# Patient Record
Sex: FEMALE | Race: WHITE
[De-identification: names, ages, dates, MRNs, and addresses within clinical notes are randomized per-mention and may not be internally consistent; named-entity substitution may affect disease eponyms.]

---

## 2020-03-05 ENCOUNTER — HOSPITAL ENCOUNTER (EMERGENCY)
Dept: HOSPITAL 75 - ER | Age: 45
Discharge: HOME | End: 2020-03-05
Payer: COMMERCIAL

## 2020-03-05 VITALS — DIASTOLIC BLOOD PRESSURE: 95 MMHG | SYSTOLIC BLOOD PRESSURE: 144 MMHG

## 2020-03-05 VITALS — HEIGHT: 67.01 IN | WEIGHT: 165.35 LBS | BODY MASS INDEX: 25.95 KG/M2

## 2020-03-05 DIAGNOSIS — S63.502A: Primary | ICD-10-CM
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
## 2020-03-05 PROCEDURE — 73090 X-RAY EXAM OF FOREARM: CPT

## 2020-03-05 PROCEDURE — 73110 X-RAY EXAM OF WRIST: CPT

## 2020-03-05 NOTE — ED HIP PAIN/INJURY
General


Chief Complaint:  Upper Extremity


Stated Complaint:  LEFT ARM INJ


Nursing Triage Note:  


Pt amb to triage with c/o L wrist discomfort. Pt reports approx 30 minutes pta, 


she "slipped" on wet grass, landing on her L side. Pt reports intermittent 


numbness/tingling followed by sharp pain to L wrist. Pt displays ability to move




L wrist and wiggle distal fingers. Cap refill <3 seconds with present radial 


pulse. Denies further injury. A&OX4.


Source:  patient


Exam Limitations:  no limitations





History of Present Illness


Date Seen by Provider:  Mar 5, 2020


Time Seen by Provider:  12:25


Initial Comments


To ER with left wrist pain after a fall when she slipped on wet grass 30 minutes

prior to arrival.


Timing/Duration:  just prior to arrival


Severity:  moderate


Location:  other (left wrist)


Method of Injury:  fell


Modifying Factors:  Worse With Movement





Allergies and Home Medications


Allergies


Coded Allergies:  


     No Known Drug Allergies (Unverified , 3/5/20)





Patient Home Medication List


Home Medication List Reviewed:  Yes





Review of Systems


Constitutional:  see HPI


EENTM:  see HPI


Respiratory:  no symptoms reported


Cardiovascular:  no symptoms reported


Genitourinary:  no symptoms reported


Musculoskeletal:  see HPI


Skin:  no symptoms reported


Psychiatric/Neurological:  No Symptoms Reported





Past Medical-Social-Family Hx


Patient Social History


Recent Foreign Travel:  No


Contact w/Someone Who Travel:  No


Recent Infectious Disease Expo:  No





Physical Exam


Vital Signs





Vital Signs - First Documented








 3/5/20





 12:15


 


Temp 36.9


 


Pulse 105


 


Resp 17


 


B/P (MAP) 144/95 (111)


 


Pulse Ox 100


 


O2 Delivery Room Air





Capillary Refill : Less Than 3 Seconds


Height, Weight, BMI


Height: '"


Weight: lbs. oz. kg; 25.00 BMI


Method:


General Appearance:  No Apparent Distress, WD/WN


Neck:  Full Range of Motion, Normal Inspection


Respiratory:  Normal Breath Sounds, No Accessory Muscle Use, No Respiratory 

Distress


Gastrointestinal:  Non Tender, Soft


Extremity:  Other (brisk capillary refill of the fingertips, there is pain 

without obvious deformity to the left wrist.)


Neurologic/Psychiatric:  Alert, Oriented x3


Skin:  Normal Color, Warm/Dry





Progress/Results/Core Measures


Results/Orders


My Orders





Orders - ALVARO ELIZABETH


Hydrocodone/Apap 5/325 Tablet (Lortab 5 (3/5/20 12:30)


Wrist, Left, 3 Views Or More (3/5/20 12:23)


Forearm, Left, 2 Views (3/5/20 13:00)





Medications Given in ED





Current Medications








 Medications  Dose


 Ordered  Sig/Cameron


 Route  Start Time


 Stop Time Status Last Admin


Dose Admin


 


 Acetaminophen/


 Hydrocodone Bitart  1 tab  ONCE  ONCE


 PO  3/5/20 12:30


 3/5/20 12:31 DC 3/5/20 12:28


1 TAB








Vital Signs/I&O











 3/5/20





 12:15


 


Temp 36.9


 


Pulse 105


 


Resp 17


 


B/P (MAP) 144/95 (111)


 


Pulse Ox 100


 


O2 Delivery Room Air














Blood Pressure Mean:                    111











Departure


Communication (Admissions)


She is able to supinate and pronate which would not support a diagnosis of 

radial ulnar dislocation





Impression





   Primary Impression:  


   Wrist sprain


   Qualified Codes:  S63.502A - Unspecified sprain of left wrist, initial 

   encounter


Disposition:  01 HOME, SELF-CARE


Condition:  Stable





Departure-Patient Inst.


Decision time for Depature:  12:45


Referrals:  


PAOLO POTTS DO (PCP)


Primary Care Physician


Patient Instructions:  Wrist Sprain (DC)





Add. Discharge Instructions:  


1. Will splint as needed for comfort


2. Follow-up with your doctor this week as he may wish to refer you to 

orthopedics


3. Tylenol and pain control. If you have persistent pain into next week and MRI 

may be helpful to evaluate for a small fracture not seen on x-ray





All discharge instructions reviewed with patient and/or family. Voiced 

understanding.











ALVARO ELIZABETH              Mar 5, 2020 12:26

## 2020-03-05 NOTE — DIAGNOSTIC IMAGING REPORT
INDICATION: Pain, injury.



COMPARISON: None available.



TECHNIQUE: Three radiographs of the left wrist dated March 5, 2020.



FINDINGS: No acute fracture. The distal ulna is posteriorly

positioned in relationship to the distal radius on the lateral

radiograph. Carpal alignment is well maintained. Scapholunate

distance is within normal limits. No suspicious radiopaque

foreign body.



IMPRESSION: Distal ulna is slightly posteriorly positioned in

relationship to the distal radius. This may relate to subluxation

of the distal radioulnar joint, though this may simply be

positional in nature. Recommend correlation with physical

examination and pain.



Otherwise, unremarkable examination.



Dictated by: 



  Dictated on workstation # [id number]

## 2020-03-05 NOTE — DIAGNOSTIC IMAGING REPORT
INDICATION: Fall.



TIME OF EXAM: 1:10 p.m.



FINDINGS: Two views of the left forearm were obtained. Alignment

at the elbow and wrist appears normal. The radius and ulna appear

to be intact. No fractures are seen. There is no dislocation.



IMPRESSION: No acute bony abnormality is detected.



Dictated by: 



  Dictated on workstation # [id number]

## 2020-03-09 NOTE — XMS REPORT
Continuity of Care Document

                             Created on: 2020



ARBEN ACOSTA

External Reference #: [id number]

: 1975

Sex: Female



Demographics





                          Address                   [address] [place]  [address]

 

                          Home Phone                [phone] x

 

                          Preferred Language        Unknown

 

                          Marital Status            Unknown

 

                          Moravian Affiliation     Unknown

 

                          Race                      Unknown

 

                          Ethnic Group              Unknown





Author





                          Organization              Unknown

 

                          Address                   Unknown

 

                          Phone                     Unavailable



              



Allergies

      



             Active           Description           Code           Type         

  Severity   

                Reaction           Onset           Reported/Identified          

 

Relationship to Patient                 Clinical Status        

 

                Yes             No Known Drug Allergies           [id number]    

       Drug 

Allergy           Unknown           N/A                             2020  

      

                                                             



                  



Medications

      



There is no data.                  



Problems

      



             Date Dx Coded           Attending           Type           Code    

       

Diagnosis                               Diagnosed By        

 

                2015           PAOLO POTTS DO           Ot       

       729.81      

                                                             

 

                01/10/2016           PAOLO POTTS DO           Ot       

       729.81      

                                                             

 

                2019           PAOLO POTTS DO           Ot       

       729.81      

                          SWELLING OF LIMB                    

 

                2020           PAOLO POTTS DO           Ot       

       729.81      

                          SWELLING OF LIMB                    



                        



Procedures

      



There is no data.                  



Results

      



There is no data.              



Encounters

      



                ACCT No.           Visit Date/Time           Discharge          

 Status         

             Pt. Type           Provider           Facility           Loc./Unit 

          

Complaint        

 

                    [id number]           2020 12:08:00           

020 13:30:00        

                DIS             Emergency           ALVARO ELIZABETH         

  [facility]           ER                        LEFT ARM INJ        

 

                    [id number]           2015 14:23:00           

015 23:59:59        

                CLS             Outpatient           PAOLO POTTS DO    

       [facility] 

[facility]           RAD                       LT CLAVICLE SWO

LLEN AND 

SOFT AND MUSY        

 

             [id number]           2020 08:45:00                        P

EN           

Preadmit                  PAOLO POTTS DO           [facility]                 RAD                       1ST MAMMO, BASELINE

## 2020-03-09 NOTE — XMS REPORT
Patient Summarization Differential

                             Created on: 2020



ARBEN ACOSTA

External Reference #: [id number]

: 1975

Sex: Female



Demographics





                          Address                   [address]



 

                          Home Phone                [phone]

 

                          Preferred Language        Unknown

 

                          Marital Status            Unknown

 

                          [employer]eh[employer] Affiliation     Unknown

 

                          Race                      White

 

                          Ethnic Group              Unknown





Author





                          Author                    [facility]

 

                          Organization              [facility]

 

                          Address                   [address] [place] [address]



 

                          Phone                     [phone]







Care Team Providers





                    Care Team Member Name Role                Phone

 

                          Unavailable               Unavailable



                                            



Allergies

          No Information                                                        
 



Medications

          No Information                                                        
 



Problems

                      



      



           Problem   Normalized  Date of   Normalized  Normalized  Provider  Fac

ility



              Classification  Problem(s)   Problem      Problem      Problem Sta

tus  



                           Onset/Resoluti            Duration   



                                         on    

 

      



            Other      Swelling of   Episodic   Active     PAOLO    Not Availa

ble



                 connective      limb            GELLENDER , DO  ([id number])



                                         tissue disease      



                                         (1 source.)      



                                                                              



Procedures

          No Information                                                        
 



Immunizations

          No Information                                                        
 



Results

          No Information                                                        
 



Vital Signs

          No Information                                                        
 



Interventions

          No Information                                                        
 



Plan of Treatment

          No Information                                                        
 



Goals

          No Information                                                        
 



Social History

          No Information                                                        
 



Functional Status

          No Information                                                        
 



Mental Status

          No Information                                                        
 



Encounters

                      



    



              Encounter    Normalized Encounter  Encounter Diagnosis  Care Provi

dylan  

Organization



                           Date                      Type   

 

    



              2015   Patient encounter  no information  no name (no phone)

  no 

organization name



                           procedure                 (no phone)



                                                                              



Medical Equipment

          No Information                                                        
 



Payers

          No Information                                                



Additional Source Comments

          This clinical document has been generated using [employer] 
software that has been certified by the Office of the National Coordinator for 
Health Information Technology (ONC 15.99.04.3023.Diam.31.00.0.837769) and the 
National Committee for  (NCQA, as an eMeasure certified 
technology).            

            

FOR RECORDS PERTAINING TO PATIENTS WHO ARE OR HAVE BEEN ENROLLED IN A CHEMICAL D
EPENDENCY/SUBSTANCE ABUSE PROGRAM, SOME INFORMATION MAY BE OMITTED. This clinica
l summary was aggregated from multiple sources.  Caution should be exercised in 
using it in the provision of clinical care. This summary normalizes information 
from multiple sources, and as a consequence, information in this document may ma
terially change the coding, format and clinical context of patient data. In ishmael
tion, data may be omitted in some cases. CLINICAL DECISIONS SHOULD BE BASED ON T
HE PRIMARY CLINICAL RECORDS. [facility] provides no warranty or guara
ntee of the accuracy or completeness of information in this document.The followi
ng information is based on time limited clinical information

## 2020-03-13 ENCOUNTER — HOSPITAL ENCOUNTER (OUTPATIENT)
Dept: HOSPITAL 75 - RAD | Age: 45
End: 2020-03-13
Attending: FAMILY MEDICINE
Payer: COMMERCIAL

## 2020-03-13 DIAGNOSIS — Z12.31: Primary | ICD-10-CM

## 2020-03-13 DIAGNOSIS — R92.8: ICD-10-CM

## 2020-03-13 PROCEDURE — 77067 SCR MAMMO BI INCL CAD: CPT

## 2020-03-13 NOTE — DIAGNOSTIC IMAGING REPORT
INDICATION: Routine screening.



No prior mammograms are available for comparison. This is a

baseline study.



2-D and 3-D bilateral screening mammography was performed with a

Computer Aided Detection (CAD) system. 3-D tomosynthesis was also

performed and reviewed.



FINDINGS: Both breasts are heterogeneously dense, limiting the

sensitivity of mammography. No spiculated mass or malignant

appearing microcalcifications are seen. There is a prominent

density in the superior and outer right breast. This could

represent prominent fibroglandular tissue versus enlarged cyst.

Additional views are recommended.



IMPRESSION: Right breast density. Additional views recommended

for further evaluation.



ACR BI-RADS Category 0: Incomplete. (Needs additional imaging

evaluation).

Result letter will be mailed to the patient.

Note: At least 10% of breast cancer is not imaged by mammography.



Dictated by: 



  Dictated on workstation # [id number]

## 2020-03-25 ENCOUNTER — HOSPITAL ENCOUNTER (OUTPATIENT)
Dept: HOSPITAL 75 - RAD | Age: 45
End: 2020-03-25
Attending: FAMILY MEDICINE
Payer: COMMERCIAL

## 2020-03-25 DIAGNOSIS — N63.11: Primary | ICD-10-CM

## 2020-03-25 NOTE — DIAGNOSTIC IMAGING REPORT
INDICATION: 

Right breast density. Patient presents for additional views.



COMPARISON: 

Correlation is made with the recent screening study from

03/13/2020.



TECHNIQUE: 

Unilateral right 2D and 3D diagnostic mammography was performed

including spot compression CC and ML views as well as a 90 degree

lateral view.



FINDINGS:

There is some persistent density in the upper-outer right breast

at posterior depth. This is approximately 8 to 10 cm from the

nipple. No other abnormalities are seen.



IMPRESSION: 

Persistent density in the upper outer right breast. While this

could represent superimposed fibroglandular tissue, other

etiologies cannot be excluded. Further evaluation with ultrasound

is recommended and will be performed today.



ACR BI-RADS Category 0: Incomplete. (Needs additional imaging

evaluation).

Result letter will be mailed to the patient.

Note: At least 10% of breast cancer is not imaged by mammography.



Dictated by: 



  Dictated on workstation # [id number]

## 2020-03-25 NOTE — DIAGNOSTIC IMAGING REPORT
INDICATION: 

Incomplete resolution of density with spot compression on earlier

diagnostic views. Ultrasound was performed to exclude the

possibility of an underlying mass. 



FINDINGS: 

Sonographic surveillance throughout the upper outer quadrant of

the right breast in the region of questionable mammographic

density reveals no solid or cystic mass. No ductal ectasia. No

architectural distortion. 



IMPRESSION: 

Normal targeted right breast ultrasound. In light of the absence

of true mass mammographically as well as a normal targeted

ultrasound and assuming the absence of adverse clinical change,

the patient could be returned to routine bilateral screening next

due in 1 year.



ACR BI-RADS Category 2: Benign findings.



Dictated by: 



  Dictated on workstation # [id number]

## 2021-03-18 ENCOUNTER — HOSPITAL ENCOUNTER (OUTPATIENT)
Dept: HOSPITAL 75 - RAD | Age: 46
End: 2021-03-18
Attending: FAMILY MEDICINE
Payer: COMMERCIAL

## 2021-03-18 DIAGNOSIS — M41.86: ICD-10-CM

## 2021-03-18 DIAGNOSIS — M51.36: Primary | ICD-10-CM

## 2021-03-18 DIAGNOSIS — M25.78: ICD-10-CM

## 2021-03-18 DIAGNOSIS — M48.061: ICD-10-CM

## 2021-03-18 PROCEDURE — 72100 X-RAY EXAM L-S SPINE 2/3 VWS: CPT

## 2021-03-18 NOTE — DIAGNOSTIC IMAGING REPORT
INDICATION:  PAIN IN LOWER BACK GETTING WORSE ONSET 1 YEAR



TECHNIQUE: 

 AP, Lateral and Spot imaging of the lumbar spine



CORRELATION STUDY: 

None



FINDINGS:

There is some straightening of normal lumbar lordosis along with

a very mild leftward curvature. Asymmetric disc space narrowing

along the right aspect L2-L3 levels with asymmetric sclerosis and

osteophytes measured on the right. This is at the concavity of

the curvature. The lumbar vertebral body heights overall are

maintained. No acute appearing compression deformity. The

remaining disc spaces overall appear to be fairly well maintained

as well. Sacroiliac joints are maintained and unremarkable.



IMPRESSION: 

No radiographic evidence for acute bony abnormality of the lumbar

spine.  Mild leftward curvature lumbar spine with asymmetric disc

space narrowing or reactive endplate sclerosis and osteophyte

formation L2-L3 level on the right.



Dictated by: 



  Dictated on workstation # [id number]

## 2021-10-22 ENCOUNTER — HOSPITAL ENCOUNTER (OUTPATIENT)
Dept: HOSPITAL 75 - RAD | Age: 46
End: 2021-10-22
Attending: FAMILY MEDICINE
Payer: COMMERCIAL

## 2021-10-22 DIAGNOSIS — Z12.31: Primary | ICD-10-CM

## 2021-10-22 PROCEDURE — 77067 SCR MAMMO BI INCL CAD: CPT

## 2021-10-22 PROCEDURE — 77063 BREAST TOMOSYNTHESIS BI: CPT

## 2021-10-22 NOTE — DIAGNOSTIC IMAGING REPORT
Indication: Routine screening.



Comparison is made with prior mammogram 03/13/2020.



2-D and 3-D bilateral screening mammography was performed with

CAD.



Both breasts are heterogeneously dense, limiting the sensitivity

of mammography. There are benign calcifications in both breasts.

The parenchymal pattern is stable. No mass or malignant-appearing

microcalcifications are seen. Axillae are unremarkable.



IMPRESSION: 



BI-RADS category 2.



No mammographic features suspicious for malignancy are

identified.



ACR BI-RADS Category 2: Benign findings.

Result letter will be mailed to the patient.

Note: At least 10% of breast cancer is not imaged by mammography.



Dictated by: 



  Dictated on workstation # [id number]

## 2022-02-13 ENCOUNTER — HOSPITAL ENCOUNTER (EMERGENCY)
Dept: HOSPITAL 75 - ER | Age: 47
Discharge: HOME | End: 2022-02-13
Payer: COMMERCIAL

## 2022-02-13 VITALS — SYSTOLIC BLOOD PRESSURE: 133 MMHG | DIASTOLIC BLOOD PRESSURE: 68 MMHG

## 2022-02-13 VITALS — BODY MASS INDEX: 29.86 KG/M2 | HEIGHT: 66.93 IN | WEIGHT: 190.26 LBS

## 2022-02-13 DIAGNOSIS — Z20.822: ICD-10-CM

## 2022-02-13 DIAGNOSIS — R07.9: Primary | ICD-10-CM

## 2022-02-13 DIAGNOSIS — R55: ICD-10-CM

## 2022-02-13 DIAGNOSIS — Z72.0: ICD-10-CM

## 2022-02-13 LAB
ALBUMIN SERPL-MCNC: 4.3 GM/DL (ref 3.2–4.5)
ALP SERPL-CCNC: 77 U/L (ref 40–136)
ALT SERPL-CCNC: 17 U/L (ref 0–55)
APTT BLD: 28 SEC (ref 24–35)
BASOPHILS # BLD AUTO: 0 10^3/UL (ref 0–0.1)
BASOPHILS NFR BLD AUTO: 1 % (ref 0–10)
BILIRUB SERPL-MCNC: 0.6 MG/DL (ref 0.1–1)
BUN/CREAT SERPL: 24
CALCIUM SERPL-MCNC: 9.1 MG/DL (ref 8.5–10.1)
CHLORIDE SERPL-SCNC: 107 MMOL/L (ref 98–107)
CO2 SERPL-SCNC: 19 MMOL/L (ref 21–32)
CREAT SERPL-MCNC: 0.71 MG/DL (ref 0.6–1.3)
EOSINOPHIL # BLD AUTO: 0.2 10^3/UL (ref 0–0.3)
EOSINOPHIL NFR BLD AUTO: 2 % (ref 0–10)
GFR SERPLBLD BASED ON 1.73 SQ M-ARVRAT: 106 ML/MIN
GLUCOSE SERPL-MCNC: 110 MG/DL (ref 70–105)
HCT VFR BLD CALC: 44 % (ref 35–52)
HGB BLD-MCNC: 14.7 G/DL (ref 11.5–16)
INR PPP: 1 (ref 0.8–1.4)
LYMPHOCYTES # BLD AUTO: 2.3 10^3/UL (ref 1–4)
LYMPHOCYTES NFR BLD AUTO: 29 % (ref 12–44)
MAGNESIUM SERPL-MCNC: 2 MG/DL (ref 1.6–2.4)
MANUAL DIFFERENTIAL PERFORMED BLD QL: NO
MCH RBC QN AUTO: 30 PG (ref 25–34)
MCHC RBC AUTO-ENTMCNC: 33 G/DL (ref 32–36)
MCV RBC AUTO: 91 FL (ref 80–99)
MONOCYTES # BLD AUTO: 0.9 10^3/UL (ref 0–1)
MONOCYTES NFR BLD AUTO: 11 % (ref 0–12)
NEUTROPHILS # BLD AUTO: 4.5 10^3/UL (ref 1.8–7.8)
NEUTROPHILS NFR BLD AUTO: 57 % (ref 42–75)
PLATELET # BLD: 262 10^3/UL (ref 130–400)
PMV BLD AUTO: 8.9 FL (ref 9–12.2)
POTASSIUM SERPL-SCNC: 4.3 MMOL/L (ref 3.6–5)
PROT SERPL-MCNC: 7.3 GM/DL (ref 6.4–8.2)
PROTHROMBIN TIME: 13.1 SEC (ref 12.2–14.7)
SODIUM SERPL-SCNC: 137 MMOL/L (ref 135–145)
T4 FREE SERPL-MCNC: 1.21 NG/DL (ref 0.7–1.48)
WBC # BLD AUTO: 7.9 10^3/UL (ref 4.3–11)

## 2022-02-13 PROCEDURE — 84703 CHORIONIC GONADOTROPIN ASSAY: CPT

## 2022-02-13 PROCEDURE — 84439 ASSAY OF FREE THYROXINE: CPT

## 2022-02-13 PROCEDURE — 87636 SARSCOV2 & INF A&B AMP PRB: CPT

## 2022-02-13 PROCEDURE — 80053 COMPREHEN METABOLIC PANEL: CPT

## 2022-02-13 PROCEDURE — 36415 COLL VENOUS BLD VENIPUNCTURE: CPT

## 2022-02-13 PROCEDURE — 84484 ASSAY OF TROPONIN QUANT: CPT

## 2022-02-13 PROCEDURE — 83874 ASSAY OF MYOGLOBIN: CPT

## 2022-02-13 PROCEDURE — 86141 C-REACTIVE PROTEIN HS: CPT

## 2022-02-13 PROCEDURE — 71045 X-RAY EXAM CHEST 1 VIEW: CPT

## 2022-02-13 PROCEDURE — 83735 ASSAY OF MAGNESIUM: CPT

## 2022-02-13 PROCEDURE — 85730 THROMBOPLASTIN TIME PARTIAL: CPT

## 2022-02-13 PROCEDURE — 93041 RHYTHM ECG TRACING: CPT

## 2022-02-13 PROCEDURE — 85610 PROTHROMBIN TIME: CPT

## 2022-02-13 PROCEDURE — 84443 ASSAY THYROID STIM HORMONE: CPT

## 2022-02-13 PROCEDURE — 85025 COMPLETE CBC W/AUTO DIFF WBC: CPT

## 2022-02-13 PROCEDURE — 93005 ELECTROCARDIOGRAM TRACING: CPT

## 2022-02-13 RX ADMIN — NITROGLYCERIN PRN MG: 0.4 TABLET SUBLINGUAL at 11:20

## 2022-02-13 RX ADMIN — NITROGLYCERIN PRN MG: 0.4 TABLET SUBLINGUAL at 10:39

## 2022-02-13 NOTE — ED CHEST PAIN
General


Chief Complaint:  Chest Pain


Stated Complaint:  CHEST PAIN,SOB


Nursing Triage Note:  


AMB TO ED   FROM [facility] WITH C/O CHEST PAIN  ONSET  7AM TODAY. FELT LIKE AT TIMES 


HEART RACING. HAS HAD 3 NEG COVID TEST. IN  PAST WEEK.


Source:  patient


Exam Limitations:  no limitations





Allergies and Home Medications


Allergies


Coded Allergies:  


     No Known Drug Allergies (Unverified , 3/5/20)





Past Medical-Social-Family Hx


Patient Social History


Tobacco Use?:  Yes


Substance use?:  No


Alcohol Use?:  Yes


Alcohol Frequency:  Rarely





Immunizations Up To Date


First/Initial COVID19 Vaccinat:  MARCH


Second COVID19 Vaccination Wil:  MARCH


COVID19 Vaccine :  DEC





Past Medical History


Surgeries:  Yes


Orthopedic, Tubal Ligation


Respiratory:  No


Cardiac:  No


Neurological:  No


Genitourinary:  No


Gastrointestinal:  No


Musculoskeletal:  No


Endocrine:  Yes


Hypothyroidsim


HEENT:  No


Cancer:  No


Psychosocial:  Yes


Depression


Integumentary:  No





Physical Exam


Vital Signs





Vital Signs - First Documented








 2/13/22





 10:10


 


Temp 36.0


 


Pulse 92


 


Resp 18


 


B/P (MAP) 142/70 (94)


 


Pulse Ox 99


 


O2 Delivery Room Air





Capillary Refill : Less Than 3 Seconds


Height, Weight, BMI


Height: '"


Weight: lbs. oz. kg; 29.00 BMI


Method:





Progress/Results/Core Measures


Results/Orders


Lab Results





Laboratory Tests








Test


 2/13/22


10:10 2/13/22


11:20 2/13/22


12:45 Range/Units


 


 


White Blood Count


 7.9 


 


 


 4.3-11.0


10^3/uL


 


Red Blood Count


 4.88 


 


 


 3.80-5.11


10^6/uL


 


Hemoglobin 14.7    11.5-16.0  g/dL


 


Hematocrit 44    35-52  %


 


Mean Corpuscular Volume 91    80-99  fL


 


Mean Corpuscular Hemoglobin 30    25-34  pg


 


Mean Corpuscular Hemoglobin


Concent 33 


 


 


 32-36  g/dL





 


Red Cell Distribution Width 13.2    10.0-14.5  %


 


Platelet Count


 262 


 


 


 130-400


10^3/uL


 


Mean Platelet Volume 8.9 L   9.0-12.2  fL


 


Immature Granulocyte % (Auto) 0     %


 


Neutrophils (%) (Auto) 57    42-75  %


 


Lymphocytes (%) (Auto) 29    12-44  %


 


Monocytes (%) (Auto) 11    0-12  %


 


Eosinophils (%) (Auto) 2    0-10  %


 


Basophils (%) (Auto) 1    0-10  %


 


Neutrophils # (Auto)


 4.5 


 


 


 1.8-7.8


10^3/uL


 


Lymphocytes # (Auto)


 2.3 


 


 


 1.0-4.0


10^3/uL


 


Monocytes # (Auto)


 0.9 


 


 


 0.0-1.0


10^3/uL


 


Eosinophils # (Auto)


 0.2 


 


 


 0.0-0.3


10^3/uL


 


Basophils # (Auto)


 0.0 


 


 


 0.0-0.1


10^3/uL


 


Immature Granulocyte # (Auto)


 0.0 


 


 


 0.0-0.1


10^3/uL


 


Prothrombin Time 13.1    12.2-14.7  SEC


 


INR Comment 1.0    0.8-1.4  


 


Activated Partial


Thromboplast Time 28 


 


 


 24-35  SEC





 


Sodium Level 137    135-145  MMOL/L


 


Potassium Level 4.3    3.6-5.0  MMOL/L


 


Chloride Level 107      MMOL/L


 


Carbon Dioxide Level 19 L   21-32  MMOL/L


 


Anion Gap 11    5-14  MMOL/L


 


Blood Urea Nitrogen 17    7-18  MG/DL


 


Creatinine


 0.71 


 


 


 0.60-1.30


MG/DL


 


Estimat Glomerular Filtration


Rate 106 


 


 


  





 


BUN/Creatinine Ratio 24     


 


Glucose Level 110 H     MG/DL


 


Calcium Level 9.1    8.5-10.1  MG/DL


 


Corrected Calcium 8.9    8.5-10.1  MG/DL


 


Magnesium Level 2.0    1.6-2.4  MG/DL


 


Total Bilirubin 0.6    0.1-1.0  MG/DL


 


Aspartate Amino Transf


(AST/SGOT) 13 


 


 


 5-34  U/L





 


Alanine Aminotransferase


(ALT/SGPT) 17 


 


 


 0-55  U/L





 


Alkaline Phosphatase 77      U/L


 


Myoglobin


 29.1 


 


 


 10.0-92.0


NG/ML


 


Troponin I < 0.028   < 0.028  <0.028  NG/ML


 


C-Reactive Protein High


Sensitivity 0.29 


 


 


 0.00-0.50


MG/DL


 


Total Protein 7.3    6.4-8.2  GM/DL


 


Albumin 4.3    3.2-4.5  GM/DL


 


Thyroid Stimulating Hormone


(TSH) 0.40 


 


 


 0.35-4.94


UIU/ML


 


Free Thyroxine


 1.21 


 


 


 0.70-1.48


NG/DL


 


Serum Pregnancy Test,


Qualitative NEGATIVE 


 


 


 NEGATIVE  





 


Influenza Type A (RT-PCR)  Not Detected   Not Detecte  


 


Influenza Type B (RT-PCR)  Not Detected   Not Detecte  


 


SARS-CoV-2 RNA (RT-PCR)  Not Detected   Not Detecte  








My Orders





Orders - MISAEL ARMIJO MD


Cbc With Automated Diff (2/13/22 10:14)


Magnesium (2/13/22 10:14)


Chest 1 View, Ap/Pa Only (2/13/22 10:14)


Ekg Tracing (2/13/22 10:14)


Comprehensive Metabolic Panel (2/13/22 10:14)


Myoglobin Serum (2/13/22 10:14)


Protime With Inr (2/13/22 10:14)


Partial Thromboplastin Time (2/13/22 10:14)


O2 (2/13/22 10:14)


Monitor-Rhythm Ecg Trace Only (2/13/22 10:14)


Lipid Panel (2/14/22 06:00)


Ed Iv/Invasive Line Start (2/13/22 10:14)


Troponin I [place] (2/13/22 10:14)


Hs C Reactive Protein (2/13/22 10:14)


Covid 19 Inhouse Test (2/13/22 10:14)


Influenza A And B By Pcr (2/13/22 10:14)


Hcg,Qualitative Serum (2/13/22 10:14)


Nitroglycerin 0.4 Mg Btl 25's (Nitrostat (2/13/22 10:30)


Aspirin Chewable Tablet (Baby Aspirin Ch (2/13/22 10:30)


Thyroid Stimulating Hormone (2/13/22 10:33)


Free T4 (Free Thyroxine) (2/13/22 10:33)


Troponin I [place] (2/13/22 12:45)





Medications Given in ED





Current Medications








 Medications  Dose


 Ordered  Sig/Cameron


 Route  Start Time


 Stop Time Status Last Admin


Dose Admin


 


 Aspirin  324 mg  ONCE  ONCE


 PO  2/13/22 10:30


 2/13/22 10:31 DC 2/13/22 10:38


324 MG


 


 Nitroglycerin  0.4 mg  UD  PRN


 SL  2/13/22 10:30


    2/13/22 11:20


0.4 MG








Vital Signs/I&O











 2/13/22 2/13/22





 10:10 11:39


 


Temp 36.0 


 


Pulse 92 82


 


Resp 18 18


 


B/P (MAP) 142/70 (94) 119/67


 


Pulse Ox 99 96


 


O2 Delivery Room Air Room Air














Blood Pressure Mean:                    84











Departure


Impression





   Primary Impression:  


   Chest pain


   Qualified Codes:  R07.9 - Chest pain, unspecified


   Additional Impression:  


   Near syncope


Disposition:  01 HOME, SELF-CARE


Condition:  Improved





Departure-Patient Inst.


Decision time for Depature:  13:39


Referrals:  


PAOLO POTTS DO (PCP/Family)


Primary Care Physician








LAWANDA QUISPE MD [facility]





BALAJI DUTTON MD, DAVID L JR, MD


Patient Instructions:  Chest Pain, Near Fainting (DC)





Add. Discharge Instructions:  


Drink plenty of clear liquids to stay well-hydrated.


Take aspirin 81 mg daily until instructed otherwise.


Although no serious medical problems were identified in your work-up in the 

emergency room, your work-up is not complete.  You need to discuss your 

lightheadedness and chest pain with your primary care provider and seek referral

to a cardiologist for further evaluation.  You may get a referral through Dr. Potts's office or contact one of the cardiologists below.


Work toward stopping smoking.


Return to the ER if you have recurrent episodes of chest pain or other worsening

symptoms.





All discharge instructions reviewed with patient and/or family. Voiced 

understanding.





Copy


Copies To 1:   PAOLO POTTS JOSHUA T MD        Feb 13, 2022 13:41

## 2022-02-21 ENCOUNTER — HOSPITAL ENCOUNTER (OUTPATIENT)
Dept: HOSPITAL 75 - CARD | Age: 47
End: 2022-02-21
Attending: INTERNAL MEDICINE
Payer: COMMERCIAL

## 2022-02-21 DIAGNOSIS — I25.10: ICD-10-CM

## 2022-02-21 DIAGNOSIS — I49.9: ICD-10-CM

## 2022-02-21 DIAGNOSIS — I35.1: Primary | ICD-10-CM

## 2022-02-21 PROCEDURE — 93306 TTE W/DOPPLER COMPLETE: CPT

## 2022-02-21 PROCEDURE — 93226 XTRNL ECG REC<48 HR SCAN A/R: CPT

## 2022-02-21 PROCEDURE — 93225 XTRNL ECG REC<48 HRS REC: CPT

## 2022-03-16 ENCOUNTER — HOSPITAL ENCOUNTER (OUTPATIENT)
Dept: HOSPITAL 75 - CARD | Age: 47
End: 2022-03-16
Attending: INTERNAL MEDICINE
Payer: COMMERCIAL

## 2022-03-16 VITALS — DIASTOLIC BLOOD PRESSURE: 80 MMHG | SYSTOLIC BLOOD PRESSURE: 115 MMHG

## 2022-03-16 VITALS — WEIGHT: 187.39 LBS | BODY MASS INDEX: 30.48 KG/M2 | HEIGHT: 65.75 IN

## 2022-03-16 DIAGNOSIS — I10: ICD-10-CM

## 2022-03-16 DIAGNOSIS — I25.10: Primary | ICD-10-CM

## 2022-03-16 PROCEDURE — 78452 HT MUSCLE IMAGE SPECT MULT: CPT

## 2022-03-16 PROCEDURE — 93017 CV STRESS TEST TRACING ONLY: CPT

## 2022-03-21 NOTE — CARDIOLOGY STRESS TEST REPORT
Stress Test Report


Date of Procedure/Referring:


Date of Procedure:  Mar 16, 2022


PCP


Balaji Desouza MD


Admitting Physician


Jose Herrera DO





Indications:


HTN





Baseline Heart Rate:


78





Baseline Blood Pressure:


Blood Pressure Systolic:  115


Blood Pressure Diastolic:  80





Vital Signs








  Date Time  Temp Pulse Resp B/P (MAP) Pulse Ox O2 Delivery O2 Flow Rate FiO2


 


3/16/22 09:25  90 20 115/80 (92) 97 Room Air  








Baseline Vital Signs





Vital Signs








  Date Time  Temp Pulse Resp B/P (MAP) Pulse Ox O2 Delivery O2 Flow Rate FiO2


 


3/16/22 09:25  90 20 115/80 (92) 97 Room Air  











Baseline EKG:


Baseline EKG:  NSR





Summary:


After explaining the procedure and details to the patient, she  signed the 

consent and was brought to the stress nuclear laboratory.





Patient exercised on standard Michael protocol, EKG, heart rate and blood pressure

were monitored continuously, resting and stress doses of radio tracer were 

injected, imaging was acquired and reviewed in the short axis, horizontal long 

axis and vertical long axis views





Patient was able to exercise for a total of 163 minutes on Michael protocol,  METs

4.6


Maximum heart rate 163      


Maximum blood pressure 202/87       


Stress EKG, Minimal nondiagnostic changes


Recovery EKG, Return to baseline


TID:  0.99


SSS:  2


SDS:  2


EF:  64





Conclusion:


1.  Poor exercise tolerance for a total of 3 minutes on standard Michael protocol,

4.6 METS achieving 93% of maximum expected heart rate.


2.  Appropriate heart rate response to exercise with hypertensive response to 

exercise with peak blood pressure 202/87 return to baseline during recovery


3.  Minimal nondiagnostic EKG changes with exercise return to baseline during 

recovery


4.  No significant ischemia or infarction on SPECT images


5.  Normal left ventricular size, ejection fraction 64%











BALAJI DESOUZA MD              Mar 21, 2022 08:34

## 2022-12-29 ENCOUNTER — HOSPITAL ENCOUNTER (OUTPATIENT)
Dept: HOSPITAL 75 - REHAB | Age: 47
LOS: 2 days | End: 2022-12-31
Attending: PAIN MEDICINE
Payer: COMMERCIAL

## 2022-12-29 DIAGNOSIS — M99.33: Primary | ICD-10-CM

## 2023-01-27 ENCOUNTER — HOSPITAL ENCOUNTER (OUTPATIENT)
Dept: HOSPITAL 75 - REHAB | Age: 48
LOS: 4 days | Discharge: HOME | End: 2023-01-31
Attending: PAIN MEDICINE
Payer: COMMERCIAL

## 2023-01-27 DIAGNOSIS — Z74.09: ICD-10-CM

## 2023-01-27 DIAGNOSIS — M99.33: Primary | ICD-10-CM

## 2023-02-15 ENCOUNTER — HOSPITAL ENCOUNTER (OUTPATIENT)
Dept: HOSPITAL 75 - REHAB | Age: 48
LOS: 13 days | Discharge: HOME | End: 2023-02-28
Attending: PAIN MEDICINE
Payer: COMMERCIAL

## 2023-02-15 DIAGNOSIS — M99.33: Primary | ICD-10-CM

## 2023-02-15 DIAGNOSIS — Z74.09: ICD-10-CM
